# Patient Record
Sex: FEMALE | Race: WHITE | NOT HISPANIC OR LATINO | URBAN - METROPOLITAN AREA
[De-identification: names, ages, dates, MRNs, and addresses within clinical notes are randomized per-mention and may not be internally consistent; named-entity substitution may affect disease eponyms.]

---

## 2017-03-14 ENCOUNTER — EMERGENCY (EMERGENCY)
Facility: HOSPITAL | Age: 62
LOS: 1 days | End: 2017-03-14
Admitting: EMERGENCY MEDICINE
Payer: COMMERCIAL

## 2017-03-14 PROCEDURE — 99282 EMERGENCY DEPT VISIT SF MDM: CPT | Mod: 25

## 2017-03-14 PROCEDURE — 12004 RPR S/N/AX/GEN/TRK7.6-12.5CM: CPT

## 2017-03-21 ENCOUNTER — EMERGENCY (EMERGENCY)
Facility: HOSPITAL | Age: 62
LOS: 1 days | Discharge: HOME | End: 2017-03-21
Attending: EMERGENCY MEDICINE | Admitting: EMERGENCY MEDICINE
Payer: COMMERCIAL

## 2017-03-21 VITALS
WEIGHT: 188.94 LBS | SYSTOLIC BLOOD PRESSURE: 149 MMHG | RESPIRATION RATE: 18 BRPM | HEIGHT: 62 IN | TEMPERATURE: 99 F | DIASTOLIC BLOOD PRESSURE: 79 MMHG | OXYGEN SATURATION: 99 % | HEART RATE: 62 BPM

## 2017-03-21 VITALS
HEART RATE: 61 BPM | DIASTOLIC BLOOD PRESSURE: 77 MMHG | TEMPERATURE: 99 F | RESPIRATION RATE: 16 BRPM | SYSTOLIC BLOOD PRESSURE: 144 MMHG

## 2017-03-21 DIAGNOSIS — Z48.02 ENCOUNTER FOR REMOVAL OF SUTURES: ICD-10-CM

## 2017-03-21 PROCEDURE — 99283 EMERGENCY DEPT VISIT LOW MDM: CPT

## 2017-03-21 PROCEDURE — 99283 EMERGENCY DEPT VISIT LOW MDM: CPT | Mod: 25

## 2017-03-21 NOTE — ED PROVIDER NOTE - NS ED MD SCRIBE ATTENDING SCRIBE SECTIONS
PAST MEDICAL/SURGICAL/SOCIAL HISTORY/HIV/DISPOSITION/PHYSICAL EXAM/REVIEW OF SYSTEMS/HISTORY OF PRESENT ILLNESS/VITAL SIGNS( Pullset)

## 2017-03-21 NOTE — ED PROVIDER NOTE - DETAILS:
Gokul Aguila MD - The scribe's documentation has been prepared under my direction and personally reviewed by me in its entirety. I confirm that the note above accurately reflects all work, treatment, procedures, and medical decision making performed by me.

## 2017-03-21 NOTE — ED PROVIDER NOTE - OBJECTIVE STATEMENT
62 y/o F pt presents to the ED complaining of oozing wound that was sutured last Tuesday. Pt is on blood thinners. Pt changes dressing once a day and puts bacitracin on it. Denies fever. No further complaints at this time.    PMD: Dr. Lim. allergic to sulfur. 62 y/o F pt presents to the ED complaining of oozing wound that was sutured last Tuesday. Pt is on blood thinners. Pt changes dressing once a day and puts bacitracin on it. Denies fever. No further complaints at this time.   PMD: Dr. Lim.   allergic to sulfur.

## 2017-03-31 ENCOUNTER — EMERGENCY (EMERGENCY)
Facility: HOSPITAL | Age: 62
LOS: 1 days | Discharge: HOME | End: 2017-03-31
Attending: EMERGENCY MEDICINE | Admitting: EMERGENCY MEDICINE
Payer: COMMERCIAL

## 2017-03-31 VITALS
DIASTOLIC BLOOD PRESSURE: 88 MMHG | OXYGEN SATURATION: 98 % | TEMPERATURE: 98 F | HEART RATE: 64 BPM | SYSTOLIC BLOOD PRESSURE: 159 MMHG | HEIGHT: 62 IN | WEIGHT: 189.6 LBS | RESPIRATION RATE: 16 BRPM

## 2017-03-31 DIAGNOSIS — S81.011A LACERATION WITHOUT FOREIGN BODY, RIGHT KNEE, INITIAL ENCOUNTER: ICD-10-CM

## 2017-03-31 PROCEDURE — G0463: CPT

## 2017-03-31 NOTE — ED PROVIDER NOTE - OBJECTIVE STATEMENT
62 y/o F pt presents to ED for staple removal from right lower extremity. Pt states staples were placed on 3/14/17 and placed on Clindamycin. Denies fevers, chills, rash to area. No further complaints at this time.

## 2017-03-31 NOTE — ED ADULT NURSE NOTE - DISCHARGE TEACHING
Pt verbalized understanding of d/c instructions, will  medication at pharmacy, and will follow up as needed and as directed by MD.

## 2017-03-31 NOTE — ED PROVIDER NOTE - NS ED MD SCRIBE ATTENDING SCRIBE SECTIONS
REVIEW OF SYSTEMS/VITAL SIGNS( Pullset)/DISPOSITION/HISTORY OF PRESENT ILLNESS/PAST MEDICAL/SURGICAL/SOCIAL HISTORY/PHYSICAL EXAM

## 2017-04-03 NOTE — ED PROCEDURE NOTE - DETAILS:
The scribe's documentation has been prepared under my direction and personally reviewed by me in its entirety. I confirm that the note above accurately reflects all work, treatment, procedures, and medical decision making performed by me.
The scribe's documentation has been prepared under my direction and personally reviewed by me in its entirety. I confirm that the note above accurately reflects all work, treatment, procedures, and medical decision making performed by me.

## 2017-04-03 NOTE — ED PROCEDURE NOTE - ATTENDING STATEMENT
I personally performed the services described in the documentation, reviewed the documentation recorded by the scribe in my presence and it accurately and completely records my words and action
I personally performed the services described in the documentation, reviewed the documentation recorded by the scribe in my presence and it accurately and completely records my words and action

## 2018-03-20 ENCOUNTER — EMERGENCY (EMERGENCY)
Facility: HOSPITAL | Age: 63
LOS: 1 days | End: 2018-03-20
Attending: EMERGENCY MEDICINE
Payer: COMMERCIAL

## 2018-03-20 VITALS
WEIGHT: 186.07 LBS | TEMPERATURE: 98 F | OXYGEN SATURATION: 95 % | RESPIRATION RATE: 17 BRPM | DIASTOLIC BLOOD PRESSURE: 92 MMHG | HEIGHT: 62 IN | SYSTOLIC BLOOD PRESSURE: 135 MMHG | HEART RATE: 94 BPM

## 2018-03-20 VITALS
SYSTOLIC BLOOD PRESSURE: 130 MMHG | DIASTOLIC BLOOD PRESSURE: 89 MMHG | RESPIRATION RATE: 18 BRPM | HEART RATE: 89 BPM | OXYGEN SATURATION: 96 %

## 2018-03-20 LAB
ALBUMIN SERPL ELPH-MCNC: 2.5 G/DL — LOW (ref 3.3–5)
ALP SERPL-CCNC: 115 U/L — SIGNIFICANT CHANGE UP (ref 40–120)
ALT FLD-CCNC: 53 U/L — SIGNIFICANT CHANGE UP (ref 12–78)
ANION GAP SERPL CALC-SCNC: 8 MMOL/L — SIGNIFICANT CHANGE UP (ref 5–17)
APTT BLD: 65.2 SEC — HIGH (ref 27.5–37.4)
AST SERPL-CCNC: 32 U/L — SIGNIFICANT CHANGE UP (ref 15–37)
BASOPHILS # BLD AUTO: 0.1 K/UL — SIGNIFICANT CHANGE UP (ref 0–0.2)
BASOPHILS NFR BLD AUTO: 1.1 % — SIGNIFICANT CHANGE UP (ref 0–2)
BILIRUB SERPL-MCNC: 0.6 MG/DL — SIGNIFICANT CHANGE UP (ref 0.2–1.2)
BUN SERPL-MCNC: 18 MG/DL — SIGNIFICANT CHANGE UP (ref 7–23)
CALCIUM SERPL-MCNC: 9 MG/DL — SIGNIFICANT CHANGE UP (ref 8.5–10.1)
CHLORIDE SERPL-SCNC: 103 MMOL/L — SIGNIFICANT CHANGE UP (ref 96–108)
CK MB BLD-MCNC: <2.1 % — SIGNIFICANT CHANGE UP (ref 0–3.5)
CK MB CFR SERPL CALC: <0.5 NG/ML — SIGNIFICANT CHANGE UP (ref 0–3.6)
CK SERPL-CCNC: 24 U/L — LOW (ref 26–192)
CO2 SERPL-SCNC: 29 MMOL/L — SIGNIFICANT CHANGE UP (ref 22–31)
CREAT SERPL-MCNC: 0.65 MG/DL — SIGNIFICANT CHANGE UP (ref 0.5–1.3)
CRP SERPL-MCNC: 22.2 MG/DL — HIGH (ref 0–0.4)
EOSINOPHIL # BLD AUTO: 0.1 K/UL — SIGNIFICANT CHANGE UP (ref 0–0.5)
EOSINOPHIL NFR BLD AUTO: 1.6 % — SIGNIFICANT CHANGE UP (ref 0–6)
ERYTHROCYTE [SEDIMENTATION RATE] IN BLOOD: 75 MM/HR — HIGH (ref 0–20)
GLUCOSE SERPL-MCNC: 112 MG/DL — HIGH (ref 70–99)
HCT VFR BLD CALC: 47 % — HIGH (ref 34.5–45)
HGB BLD-MCNC: 14.7 G/DL — SIGNIFICANT CHANGE UP (ref 11.5–15.5)
INR BLD: 10.22 RATIO — CRITICAL HIGH (ref 0.88–1.16)
LYMPHOCYTES # BLD AUTO: 1.2 K/UL — SIGNIFICANT CHANGE UP (ref 1–3.3)
LYMPHOCYTES # BLD AUTO: 15.1 % — SIGNIFICANT CHANGE UP (ref 13–44)
MCHC RBC-ENTMCNC: 29.9 PG — SIGNIFICANT CHANGE UP (ref 27–34)
MCHC RBC-ENTMCNC: 31.4 GM/DL — LOW (ref 32–36)
MCV RBC AUTO: 95.3 FL — SIGNIFICANT CHANGE UP (ref 80–100)
MONOCYTES # BLD AUTO: 0.9 K/UL — SIGNIFICANT CHANGE UP (ref 0–0.9)
MONOCYTES NFR BLD AUTO: 11.5 % — HIGH (ref 1–9)
NEUTROPHILS # BLD AUTO: 5.8 K/UL — SIGNIFICANT CHANGE UP (ref 1.8–7.4)
NEUTROPHILS NFR BLD AUTO: 70.7 % — SIGNIFICANT CHANGE UP (ref 43–77)
PLATELET # BLD AUTO: 286 K/UL — SIGNIFICANT CHANGE UP (ref 150–400)
POTASSIUM SERPL-MCNC: 3.6 MMOL/L — SIGNIFICANT CHANGE UP (ref 3.5–5.3)
POTASSIUM SERPL-SCNC: 3.6 MMOL/L — SIGNIFICANT CHANGE UP (ref 3.5–5.3)
PROT SERPL-MCNC: 7.6 G/DL — SIGNIFICANT CHANGE UP (ref 6–8.3)
PROTHROM AB SERPL-ACNC: 116.8 SEC — HIGH (ref 9.8–12.7)
RBC # BLD: 4.93 M/UL — SIGNIFICANT CHANGE UP (ref 3.8–5.2)
RBC # FLD: 14.6 % — HIGH (ref 10.3–14.5)
SODIUM SERPL-SCNC: 140 MMOL/L — SIGNIFICANT CHANGE UP (ref 135–145)
TROPONIN I SERPL-MCNC: <.015 NG/ML — SIGNIFICANT CHANGE UP (ref 0.01–0.04)
WBC # BLD: 8.2 K/UL — SIGNIFICANT CHANGE UP (ref 3.8–10.5)
WBC # FLD AUTO: 8.2 K/UL — SIGNIFICANT CHANGE UP (ref 3.8–10.5)

## 2018-03-20 PROCEDURE — 85730 THROMBOPLASTIN TIME PARTIAL: CPT

## 2018-03-20 PROCEDURE — 82553 CREATINE MB FRACTION: CPT

## 2018-03-20 PROCEDURE — 71275 CT ANGIOGRAPHY CHEST: CPT | Mod: 26

## 2018-03-20 PROCEDURE — 85652 RBC SED RATE AUTOMATED: CPT

## 2018-03-20 PROCEDURE — 93005 ELECTROCARDIOGRAM TRACING: CPT

## 2018-03-20 PROCEDURE — 99284 EMERGENCY DEPT VISIT MOD MDM: CPT | Mod: 25

## 2018-03-20 PROCEDURE — 84484 ASSAY OF TROPONIN QUANT: CPT

## 2018-03-20 PROCEDURE — 71045 X-RAY EXAM CHEST 1 VIEW: CPT | Mod: 26

## 2018-03-20 PROCEDURE — 99285 EMERGENCY DEPT VISIT HI MDM: CPT

## 2018-03-20 PROCEDURE — 86140 C-REACTIVE PROTEIN: CPT

## 2018-03-20 PROCEDURE — 93010 ELECTROCARDIOGRAM REPORT: CPT

## 2018-03-20 PROCEDURE — 85610 PROTHROMBIN TIME: CPT

## 2018-03-20 PROCEDURE — 71045 X-RAY EXAM CHEST 1 VIEW: CPT

## 2018-03-20 PROCEDURE — 82550 ASSAY OF CK (CPK): CPT

## 2018-03-20 PROCEDURE — 71275 CT ANGIOGRAPHY CHEST: CPT

## 2018-03-20 PROCEDURE — 80053 COMPREHEN METABOLIC PANEL: CPT

## 2018-03-20 PROCEDURE — 85027 COMPLETE CBC AUTOMATED: CPT

## 2018-03-20 RX ORDER — PHYTONADIONE (VIT K1) 5 MG
5 TABLET ORAL ONCE
Qty: 0 | Refills: 0 | Status: COMPLETED | OUTPATIENT
Start: 2018-03-20 | End: 2018-03-20

## 2018-03-20 RX ORDER — SODIUM CHLORIDE 9 MG/ML
3 INJECTION INTRAMUSCULAR; INTRAVENOUS; SUBCUTANEOUS ONCE
Qty: 0 | Refills: 0 | Status: COMPLETED | OUTPATIENT
Start: 2018-03-20 | End: 2018-03-20

## 2018-03-20 RX ADMIN — Medication 5 MILLIGRAM(S): at 14:19

## 2018-03-20 RX ADMIN — SODIUM CHLORIDE 3 MILLILITER(S): 9 INJECTION INTRAMUSCULAR; INTRAVENOUS; SUBCUTANEOUS at 11:27

## 2018-03-20 NOTE — ED ADULT NURSE REASSESSMENT NOTE - NS ED NURSE REASSESS COMMENT FT1
Spoke with pt in detail regarding admission and risks involved with elevated INR/ pt continue to request discharge with understanding of risk

## 2018-03-20 NOTE — ED PROVIDER NOTE - PROGRESS NOTE DETAILS
patient states he breathing has improved; possible viral etiology  patient states she feels better and wants to go home I spoke extensively with patient about her elevated INR 11. I recommended admission due to coagulopathy and shortness of breath. Patient wants to be discharged. Discussed risks of falls/bleeding and patient verbalized understanding and requests discharge home

## 2022-02-13 ENCOUNTER — TRANSCRIPTION ENCOUNTER (OUTPATIENT)
Age: 67
End: 2022-02-13

## 2022-09-29 NOTE — ED ADULT TRIAGE NOTE - TEMPERATURE IN FAHRENHEIT (DEGREES F)
97.7 Valtrex Counseling: I discussed with the patient the risks of valacyclovir including but not limited to kidney damage, nausea, vomiting and severe allergy.  The patient understands that if the infection seems to be worsening or is not improving, they are to call.

## 2024-07-01 ENCOUNTER — APPOINTMENT (OUTPATIENT)
Dept: PODIATRY | Facility: CLINIC | Age: 69
End: 2024-07-01

## 2024-07-17 PROBLEM — Z00.00 ENCOUNTER FOR PREVENTIVE HEALTH EXAMINATION: Status: ACTIVE | Noted: 2024-07-17

## 2024-07-18 ENCOUNTER — NON-APPOINTMENT (OUTPATIENT)
Age: 69
End: 2024-07-18

## 2024-07-19 ENCOUNTER — APPOINTMENT (OUTPATIENT)
Dept: PODIATRY | Facility: CLINIC | Age: 69
End: 2024-07-19

## 2024-07-19 VITALS
HEIGHT: 61 IN | OXYGEN SATURATION: 98 % | HEART RATE: 60 BPM | DIASTOLIC BLOOD PRESSURE: 83 MMHG | TEMPERATURE: 98.6 F | WEIGHT: 118 LBS | SYSTOLIC BLOOD PRESSURE: 138 MMHG | BODY MASS INDEX: 22.28 KG/M2

## 2024-07-19 DIAGNOSIS — L60.3 NAIL DYSTROPHY: ICD-10-CM

## 2024-07-19 PROCEDURE — 11755 BIOPSY NAIL UNIT: CPT

## 2024-07-19 PROCEDURE — 99203 OFFICE O/P NEW LOW 30 MIN: CPT | Mod: 25

## 2024-07-22 LAB — CALCOFLUOR WHITE SPEC: ABNORMAL

## 2024-07-29 ENCOUNTER — APPOINTMENT (OUTPATIENT)
Dept: PODIATRY | Facility: CLINIC | Age: 69
End: 2024-07-29
Payer: MEDICARE

## 2024-07-29 DIAGNOSIS — B35.1 TINEA UNGUIUM: ICD-10-CM

## 2024-07-29 PROCEDURE — 99203 OFFICE O/P NEW LOW 30 MIN: CPT

## 2024-07-29 RX ORDER — CICLOPIROX 71.3 MG/ML
8 SOLUTION TOPICAL
Qty: 1 | Refills: 2 | Status: ACTIVE | COMMUNITY
Start: 2024-07-29 | End: 1900-01-01

## 2024-07-30 PROBLEM — B35.1 FUNGAL NAIL INFECTION: Status: ACTIVE | Noted: 2024-07-29

## 2024-07-30 NOTE — HISTORY OF PRESENT ILLNESS
[FreeTextEntry1] : PAtient is 69 year old female presenting for f/u for painful ingrown toe nail to the right great toe. Patient states the pain has completely resolved to the right great toe nail after the procedure at the last visit. Patient relates feeling much better.

## 2024-07-30 NOTE — PHYSICAL EXAM
[General Appearance - Alert] : alert [General Appearance - In No Acute Distress] : in no acute distress [Ankle Swelling (On Exam)] : present [Ankle Swelling Bilaterally] : bilaterally  [Varicose Veins Of Lower Extremities] : bilaterally [Ankle Swelling On The Right] : mild [1+] : right foot posterior tibialis 1+ [2+] : left foot dorsalis pedis 2+ [de-identified] : 5/5 muscle strength for all muscles and tendons crossing the foot and ankle joints, ankle joint and STJ ROM intact b/l. No pain with calf compression b/l. [FreeTextEntry1] : .ligtj Light touch sensation intact to the level of the digits b/l. [Oriented To Time, Place, And Person] : oriented to person, place, and time [Affect] : the affect was normal

## 2024-07-30 NOTE — ASSESSMENT
[FreeTextEntry1] : .Patient seen and evaluated. Discussed etiology and treatment plan. Patient verbalized understanding. Discussed KOH results (+) for fungal infection. Discussed with patient laser treatment, Topical and oral treatment for fungal nail infection. Also discussed  the risks and complications of PO terbinafine and its effect on the liver with increase in the liver enzymes.  Will also need to obtain bloodwork every month to monitor the liver enzymes prior to renewing the prescription. Patient verbalized understanding . All questions answered. Patient agreed to c/w topical treatment. Rx for Ciclopirox sent to patient's pharmacy. Application instructions provided to the patient. RTC in 3 months.   Spent 30 minutes in patient care and medical decision making.

## 2024-07-30 NOTE — REASON FOR VISIT
[Follow-Up Visit] : a follow-up visit for [Other:___] : [unfilled] [FreeTextEntry2] : Patient is here today for follow up slantback

## 2024-07-30 NOTE — PHYSICAL EXAM
[General Appearance - Alert] : alert [General Appearance - In No Acute Distress] : in no acute distress [Ankle Swelling (On Exam)] : present [Ankle Swelling Bilaterally] : bilaterally  [Varicose Veins Of Lower Extremities] : bilaterally [Ankle Swelling On The Right] : mild [1+] : right foot posterior tibialis 1+ [2+] : left foot dorsalis pedis 2+ [de-identified] : 5/5 muscle strength for all muscles and tendons crossing the foot and ankle joints, ankle joint and STJ ROM intact b/l. No pain with calf compression b/l. [FreeTextEntry1] : .ligtj Light touch sensation intact to the level of the digits b/l. [Oriented To Time, Place, And Person] : oriented to person, place, and time [Affect] : the affect was normal

## 2024-09-30 PROBLEM — L60.0 INGROWN NAIL: Status: ACTIVE | Noted: 2024-09-30

## 2024-10-21 ENCOUNTER — APPOINTMENT (OUTPATIENT)
Dept: PODIATRY | Facility: CLINIC | Age: 69
End: 2024-10-21

## 2024-10-21 VITALS
HEART RATE: 61 BPM | HEIGHT: 61 IN | OXYGEN SATURATION: 99 % | BODY MASS INDEX: 22.28 KG/M2 | TEMPERATURE: 98 F | WEIGHT: 118 LBS | SYSTOLIC BLOOD PRESSURE: 108 MMHG | DIASTOLIC BLOOD PRESSURE: 68 MMHG

## 2024-10-21 PROCEDURE — 99213 OFFICE O/P EST LOW 20 MIN: CPT

## 2025-04-21 NOTE — ED ADULT NURSE NOTE - RN DISCHARGE SIGNATURE
Rx Refill Note  Requested Prescriptions     Pending Prescriptions Disp Refills    Caplyta 21 MG capsule [Pharmacy Med Name: CAPLYTA 21 MG CAPS 21 Capsule] 30 capsule 0     Sig: Take 1 capsule by mouth Daily.      Last office visit with prescribing clinician: 3/18/2025   Last telemedicine visit with prescribing clinician: Visit date not found   Next office visit with prescribing clinician: 6/16/2025       Kailey Handy MA  04/21/25, 16:16 CDT   31-Mar-2017